# Patient Record
Sex: FEMALE | Race: AMERICAN INDIAN OR ALASKA NATIVE
[De-identification: names, ages, dates, MRNs, and addresses within clinical notes are randomized per-mention and may not be internally consistent; named-entity substitution may affect disease eponyms.]

---

## 2019-07-06 ENCOUNTER — HOSPITAL ENCOUNTER (EMERGENCY)
Dept: HOSPITAL 5 - ED | Age: 34
Discharge: HOME | End: 2019-07-06
Payer: MEDICAID

## 2019-07-06 VITALS — SYSTOLIC BLOOD PRESSURE: 115 MMHG | DIASTOLIC BLOOD PRESSURE: 79 MMHG

## 2019-07-06 DIAGNOSIS — O23.42: ICD-10-CM

## 2019-07-06 DIAGNOSIS — J45.909: ICD-10-CM

## 2019-07-06 DIAGNOSIS — Z87.891: ICD-10-CM

## 2019-07-06 DIAGNOSIS — Z3A.16: ICD-10-CM

## 2019-07-06 DIAGNOSIS — O99.512: ICD-10-CM

## 2019-07-06 DIAGNOSIS — O20.0: Primary | ICD-10-CM

## 2019-07-06 LAB
BASOPHILS # (AUTO): 0 K/MM3 (ref 0–0.1)
BASOPHILS NFR BLD AUTO: 0.4 % (ref 0–1.8)
BILIRUB UR QL STRIP: (no result)
BLOOD UR QL VISUAL: (no result)
EOSINOPHIL # BLD AUTO: 0.5 K/MM3 (ref 0–0.4)
EOSINOPHIL NFR BLD AUTO: 5.7 % (ref 0–4.3)
HCT VFR BLD CALC: 33.6 % (ref 30.3–42.9)
HGB BLD-MCNC: 11.6 GM/DL (ref 10.1–14.3)
LYMPHOCYTES # BLD AUTO: 2.2 K/MM3 (ref 1.2–5.4)
LYMPHOCYTES NFR BLD AUTO: 23.5 % (ref 13.4–35)
MCHC RBC AUTO-ENTMCNC: 34 % (ref 30–34)
MCV RBC AUTO: 90 FL (ref 79–97)
MONOCYTES # (AUTO): 0.6 K/MM3 (ref 0–0.8)
MONOCYTES % (AUTO): 6.8 % (ref 0–7.3)
PH UR STRIP: 8 [PH] (ref 5–7)
PLATELET # BLD: 261 K/MM3 (ref 140–440)
PROT UR STRIP-MCNC: (no result) MG/DL
RBC # BLD AUTO: 3.73 M/MM3 (ref 3.65–5.03)
RBC #/AREA URNS HPF: 1 /HPF (ref 0–6)
UROBILINOGEN UR-MCNC: < 2 MG/DL (ref ?–2)
WBC #/AREA URNS HPF: 20 /HPF (ref 0–6)

## 2019-07-06 PROCEDURE — 86900 BLOOD TYPING SEROLOGIC ABO: CPT

## 2019-07-06 PROCEDURE — 36415 COLL VENOUS BLD VENIPUNCTURE: CPT

## 2019-07-06 PROCEDURE — 81001 URINALYSIS AUTO W/SCOPE: CPT

## 2019-07-06 PROCEDURE — 87086 URINE CULTURE/COLONY COUNT: CPT

## 2019-07-06 PROCEDURE — 84702 CHORIONIC GONADOTROPIN TEST: CPT

## 2019-07-06 PROCEDURE — 76805 OB US >/= 14 WKS SNGL FETUS: CPT

## 2019-07-06 PROCEDURE — 86901 BLOOD TYPING SEROLOGIC RH(D): CPT

## 2019-07-06 PROCEDURE — 85025 COMPLETE CBC W/AUTO DIFF WBC: CPT

## 2019-07-06 NOTE — ULTRASOUND REPORT
OBSTETRIC ULTRASOUND



INDICATION: 

Vaginal bleeding.



COMPARISON:

No prior relevant imaging studies are available for comparison.



TECHNIQUE:

Transabdominal imaging was performed.



FINDINGS:

Single viable intrauterine pregnancy is identified. 



Fetal lie:  Breech. 

Heart rate:  147 bpm. 



Fetal measurements are as follows:

Biparietal diameter 3.4 cm, 16 weeks 4 days

Head circumference 12.5 cm, 16 weeks 2 days

Abdominal circumference 10.2 cm, 16 weeks 1 day

Femur length 2.2 cm, 16 weeks 5 days





Amniotic fluid index is within normal limits. There is a grade 0 anterior placenta. Cervix is closed 
measuring 3 cm.



CONCLUSION:

Single viable intrauterine pregnancy currently in breech position has sonographic gestational age of 
16 weeks, 3 days. No abnormalities are seen.



Signer Name: Stephan Lopez MD 

Signed: 7/6/2019 4:17 AM

 Workstation Name: [id number]

## 2019-07-06 NOTE — EMERGENCY DEPARTMENT REPORT
ED Pregnancy HPI





- General


Chief complaint: Vaginal Bleeding


Stated complaint: 19WEEKS PREG AND BLEEDING


Time Seen by Provider: 19 02:52


Source: patient, family


Mode of arrival: Ambulatory


Limitations: No Limitations





- History of Present Illness


Initial comments: 





patient is a 34-year-old female who presents to the emergency room with 

complaints of vaginal spotting that began yesterday.  She states initially it 

started as a pink color but now has turned red.  Denies any heavy bleeding or 

passing clots.  She denies any dysuria, abdominal pain, nausea, vomiting, 

diarrhea, fever.  Patient is currently 19 weeks pregnant.  She states she has 

been seeing OB for this pregnancy.  she denies any past medical history or 

allergies to medications. G:3/P:1/A:1





- Related Data


                                  Previous Rx's











 Medication  Instructions  Recorded  Last Taken  Type


 


cephALEXin [Keflex] 500 mg PO BID 7 Days #14 cap 19 Unknown Rx











                                    Allergies











Allergy/AdvReac Type Severity Reaction Status Date / Time


 


No Known Allergies Allergy   Unverified 19 01:10














ED Review of Systems


ROS: 


Stated complaint: 19WEEKS PREG AND BLEEDING


Other details as noted in HPI





Comment: All other systems reviewed and negative





ED Past Medical Hx





- Past Medical History


Previous Medical History?: Yes


Hx Asthma: Yes





- Surgical History


Past Surgical History?: Yes


Additional Surgical History: C-Sec





- Social History


Smoking Status: Former Smoker


Substance Use Type: None





- Medications


Home Medications: 


                                Home Medications











 Medication  Instructions  Recorded  Confirmed  Last Taken  Type


 


cephALEXin [Keflex] 500 mg PO BID 7 Days #14 cap 19  Unknown Rx














ED Physical Exam





- General


Limitations: No Limitations


General appearance: alert, in no apparent distress





- Head


Head exam: Present: atraumatic, normocephalic





- Eye


Eye exam: Present: normal appearance, PERRL





- ENT


ENT exam: Present: mucous membranes moist





- Respiratory


Respiratory exam: Present: normal lung sounds bilaterally.  Absent: respiratory 

distress, wheezes, rales, rhonchi, stridor, chest wall tenderness, accessory 

muscle use, decreased breath sounds, prolonged expiratory





- Cardiovascular


Cardiovascular Exam: Present: regular rate, normal rhythm, normal heart sounds. 

 Absent: systolic murmur, diastolic murmur, rubs, gallop





- GI/Abdominal


GI/Abdominal exam: Present: soft, normal bowel sounds, other (gravid).  Absent: 

tenderness, guarding, rebound, rigid





- Back Exam


Back exam: Absent: CVA tenderness (R), CVA tenderness (L)





- Neurological Exam


Neurological exam: Present: alert, oriented X3





- Psychiatric


Psychiatric exam: Present: normal affect, normal mood





- Skin


Skin exam: Present: warm, dry, intact





ED Course


                                   Vital Signs











  19





  04:59


 


Temperature 98.4 F


 


Pulse Rate 93 H


 


Respiratory 16





Rate 


 


Blood Pressure 115/79





[Left] 


 


O2 Sat by Pulse 100





Oximetry 














ED Medical Decision Making





- Lab Data


Result diagrams: 


                                 19 01:38











                                   Lab Results











  19 Range/Units





  01:30 01:38 01:38 


 


WBC   9.3   (4.5-11.0)  K/mm3


 


RBC   3.73   (3.65-5.03)  M/mm3


 


Hgb   11.6   (10.1-14.3)  gm/dl


 


Hct   33.6   (30.3-42.9)  %


 


MCV   90   (79-97)  fl


 


MCH   31   (28-32)  pg


 


MCHC   34   (30-34)  %


 


RDW   13.8   (13.2-15.2)  %


 


Plt Count   261   (140-440)  K/mm3


 


Lymph % (Auto)   23.5   (13.4-35.0)  %


 


Mono % (Auto)   6.8   (0.0-7.3)  %


 


Eos % (Auto)   5.7 H   (0.0-4.3)  %


 


Baso % (Auto)   0.4   (0.0-1.8)  %


 


Lymph #   2.2   (1.2-5.4)  K/mm3


 


Mono #   0.6   (0.0-0.8)  K/mm3


 


Eos #   0.5 H   (0.0-0.4)  K/mm3


 


Baso #   0.0   (0.0-0.1)  K/mm3


 


Seg Neutrophils %   63.6   (40.0-70.0)  %


 


Seg Neutrophils #   5.9   (1.8-7.7)  K/mm3


 


HCG, Quant    02401 H  (0-4)  mIU/mL


 


Urine Color  Yellow    (Yellow)  


 


Urine Turbidity  Clear    (Clear)  


 


Urine pH  8.0 H    (5.0-7.0)  


 


Ur Specific Gravity  1.012    (1.003-1.030)  


 


Urine Protein  <15 mg/dl    (Negative)  mg/dL


 


Urine Glucose (UA)  Neg    (Negative)  mg/dL


 


Urine Ketones  Neg    (Negative)  mg/dL


 


Urine Blood  Mod    (Negative)  


 


Urine Nitrite  Neg    (Negative)  


 


Urine Bilirubin  Neg    (Negative)  


 


Urine Urobilinogen  < 2.0    (<2.0)  mg/dL


 


Ur Leukocyte Esterase  Lg    (Negative)  


 


Urine WBC (Auto)  20.0 H    (0.0-6.0)  /HPF


 


Urine RBC (Auto)  1.0    (0.0-6.0)  /HPF


 


U Epithel Cells (Auto)  2.0    (0-13.0)  /HPF


 


Blood Type     














  19 Range/Units





  01:38 


 


WBC   (4.5-11.0)  K/mm3


 


RBC   (3.65-5.03)  M/mm3


 


Hgb   (10.1-14.3)  gm/dl


 


Hct   (30.3-42.9)  %


 


MCV   (79-97)  fl


 


MCH   (28-32)  pg


 


MCHC   (30-34)  %


 


RDW   (13.2-15.2)  %


 


Plt Count   (140-440)  K/mm3


 


Lymph % (Auto)   (13.4-35.0)  %


 


Mono % (Auto)   (0.0-7.3)  %


 


Eos % (Auto)   (0.0-4.3)  %


 


Baso % (Auto)   (0.0-1.8)  %


 


Lymph #   (1.2-5.4)  K/mm3


 


Mono #   (0.0-0.8)  K/mm3


 


Eos #   (0.0-0.4)  K/mm3


 


Baso #   (0.0-0.1)  K/mm3


 


Seg Neutrophils %   (40.0-70.0)  %


 


Seg Neutrophils #   (1.8-7.7)  K/mm3


 


HCG, Quant   (0-4)  mIU/mL


 


Urine Color   (Yellow)  


 


Urine Turbidity   (Clear)  


 


Urine pH   (5.0-7.0)  


 


Ur Specific Gravity   (1.003-1.030)  


 


Urine Protein   (Negative)  mg/dL


 


Urine Glucose (UA)   (Negative)  mg/dL


 


Urine Ketones   (Negative)  mg/dL


 


Urine Blood   (Negative)  


 


Urine Nitrite   (Negative)  


 


Urine Bilirubin   (Negative)  


 


Urine Urobilinogen   (<2.0)  mg/dL


 


Ur Leukocyte Esterase   (Negative)  


 


Urine WBC (Auto)   (0.0-6.0)  /HPF


 


Urine RBC (Auto)   (0.0-6.0)  /HPF


 


U Epithel Cells (Auto)   (0-13.0)  /HPF


 


Blood Type  B POSITIVE  














- Radiology Data


Radiology results: report reviewed








OBSTETRIC ULTRASOUND 





INDICATION: 


Vaginal bleeding. 





COMPARISON: 


No prior relevant imaging studies are available for comparison. 





TECHNIQUE: 


Transabdominal imaging was performed. 





FINDINGS: 


Single viable intrauterine pregnancy is identified. 





Fetal lie: Breech. 


Heart rate: 147 bpm. 





Fetal measurements are as follows: 


Biparietal diameter 3.4 cm, 16 weeks 4 days 


Head circumference 12.5 cm, 16 weeks 2 days 


Abdominal circumference 10.2 cm, 16 weeks 1 day 


Femur length 2.2 cm, 16 weeks 5 days 








Amniotic fluid index is within normal limits. There is a grade 0 anterior 

placenta. Cervix is 


closed measuring 3 cm. 





CONCLUSION: 


Single viable intrauterine pregnancy currently in breech position has 

sonographic gestational age 


of 16 weeks, 3 days. No abnormalities are seen. 





Signer Name: Stephan Lopez MD 


Signed: 2019 4:17 AM 


Workstation Name: [id number] 








Transcribed By: PIPO 


Dictated By: Stephan Lopez MD 


Electronically Authenticated By: Stephan Lopez MD 


Signed Date/Time: 19 1855 








- Medical Decision Making





patient is a 34-year-old female who presents to the emergency room with 

complaints of vaginal spotting that began yesterday.  She states initially it 

started as a pink color but now has turned red.  Denies any heavy bleeding or 

passing clots.  She denies any dysuria, abdominal pain, nausea, vomiting, 

diarrhea, fever.  Patient is currently 19 weeks pregnant.  She states she has 

been seeing OB for this pregnancy.  she denies any past medical history or 

allergies to medications. G:3/P:1/A:1. VSS. labs WNL. UA shows evidence of UTI. 

pt is B positive blood type. US shows Single viable intrauterine pregnancy cur

rently in breech position has sonographic gestational age of 16 weeks, 3 days. 

No abnormalities are seen. pt dx with threatened miscarriage and UTI. given 

prescription for keflex. advised to please take medication as prescribed.  you 

will need to have repeat beta hCG Quant in 2 days. follow up with her OB/GYN in 

the next 2-3 days.  Return to the emergency room for any new or worsening 

symptoms.  Plenty of water and continue taking her daily prenatal vitamin.





- Differential Diagnosis


threatened , spontaneous , IUP, UTI, placenta abruption


Critical care attestation.: 


If time is entered above; I have spent that time in minutes in the direct care 

of this critically ill patient, excluding procedure time.








ED Disposition


Clinical Impression: 


 Threatened miscarriage





UTI (urinary tract infection)


Qualifiers:


 Urinary tract infection type: acute cystitis Hematuria presence: without 

hematuria Qualified Code(s): N30.00 - Acute cystitis without hematuria





Disposition:  TO HOME OR SELFCARE


Is pt being admited?: No


Does the pt Need Aspirin: No


Condition: Stable


Instructions:  Threatened Miscarriage (ED), Urinary Tract Infection in Women 

(ED)


Additional Instructions: 


Please take medication as prescribed.  you will need to have repeat beta hCG 

Quant in 2 days. follow up with her OB/GYN in the next 2-3 days.  Return to the 

emergency room for any new or worsening symptoms.  Plenty of water and continue 

taking her daily prenatal vitamin.


Prescriptions: 


cephALEXin [Keflex] 500 mg PO BID 7 Days #14 cap


Referrals: 


your, OB/GYN [Other] - 2-3 Days


Time of Disposition: 04:42


Print Language: ENGLISH

## 2019-08-13 ENCOUNTER — HOSPITAL ENCOUNTER (OUTPATIENT)
Dept: HOSPITAL 5 - TRG | Age: 34
Discharge: HOME | End: 2019-08-13
Attending: OBSTETRICS & GYNECOLOGY
Payer: MEDICAID

## 2019-08-13 VITALS — SYSTOLIC BLOOD PRESSURE: 115 MMHG | DIASTOLIC BLOOD PRESSURE: 68 MMHG

## 2019-08-13 DIAGNOSIS — O47.02: Primary | ICD-10-CM

## 2019-08-13 DIAGNOSIS — Z3A.27: ICD-10-CM

## 2019-08-13 PROCEDURE — 59025 FETAL NON-STRESS TEST: CPT
